# Patient Record
Sex: MALE | URBAN - METROPOLITAN AREA
[De-identification: names, ages, dates, MRNs, and addresses within clinical notes are randomized per-mention and may not be internally consistent; named-entity substitution may affect disease eponyms.]

---

## 2017-11-28 ENCOUNTER — OUTPATIENT (OUTPATIENT)
Dept: OUTPATIENT SERVICES | Facility: HOSPITAL | Age: 27
LOS: 1 days | Discharge: HOME | End: 2017-11-28

## 2017-11-28 DIAGNOSIS — J06.9 ACUTE UPPER RESPIRATORY INFECTION, UNSPECIFIED: ICD-10-CM

## 2018-09-19 ENCOUNTER — OUTPATIENT (OUTPATIENT)
Dept: OUTPATIENT SERVICES | Facility: HOSPITAL | Age: 28
LOS: 1 days | Discharge: HOME | End: 2018-09-19

## 2018-09-20 DIAGNOSIS — Z00.00 ENCOUNTER FOR GENERAL ADULT MEDICAL EXAMINATION WITHOUT ABNORMAL FINDINGS: ICD-10-CM

## 2019-06-12 ENCOUNTER — OUTPATIENT (OUTPATIENT)
Dept: OUTPATIENT SERVICES | Facility: HOSPITAL | Age: 29
LOS: 1 days | Discharge: HOME | End: 2019-06-12

## 2019-06-13 DIAGNOSIS — Z00.00 ENCOUNTER FOR GENERAL ADULT MEDICAL EXAMINATION WITHOUT ABNORMAL FINDINGS: ICD-10-CM

## 2024-02-12 PROBLEM — Z00.00 ENCOUNTER FOR PREVENTIVE HEALTH EXAMINATION: Status: ACTIVE | Noted: 2024-02-12

## 2024-02-16 ENCOUNTER — APPOINTMENT (OUTPATIENT)
Dept: ORTHOPEDIC SURGERY | Facility: CLINIC | Age: 34
End: 2024-02-16
Payer: MEDICAID

## 2024-02-16 VITALS
TEMPERATURE: 98 F | BODY MASS INDEX: 22.22 KG/M2 | DIASTOLIC BLOOD PRESSURE: 71 MMHG | SYSTOLIC BLOOD PRESSURE: 118 MMHG | OXYGEN SATURATION: 97 % | WEIGHT: 150 LBS | HEIGHT: 69 IN | HEART RATE: 77 BPM

## 2024-02-16 DIAGNOSIS — R22.31 LOCALIZED SWELLING, MASS AND LUMP, RIGHT UPPER LIMB: ICD-10-CM

## 2024-02-16 PROCEDURE — 99204 OFFICE O/P NEW MOD 45 MIN: CPT | Mod: 25

## 2024-02-16 PROCEDURE — 73060 X-RAY EXAM OF HUMERUS: CPT

## 2024-02-16 NOTE — DISCUSSION/SUMMARY
[Surgical risks reviewed] : Surgical risks reviewed [All Questions Answered] : Patient (and family) had all questions answered to an agreeable level of satisfaction [Interested in Proceeding] : Patient (and family) expressed understanding and interest in proceeding with the plan as outlined [de-identified] : Patient has a soft tissue mass that has been going on for 5 or 6 years.  He does not have any other masses that he.  This was biopsied in 2019 possibly for nerve sheath tumor but regardless I need the biopsy before proceeding.  For something slowly going on for a long time I think it is less likely to be sarcoma however it does need to be appropriately addressed.  Since there is a previous biopsy he will get me this old material so that I can review it and plan for possible surgery.  He understands being superficial this is likely to not be a huge issue with no significant nerve morbidity.  He may have some numbness.  He will get back to me all the information and we can plan for surgery in the future.  If imaging or pathology/biopsy was ordered, the patient was told to make an appointment to review findings right after all imaging is completed.  We discussed risks, benefits and alternatives. Rationale of care was reviewed and all questions were answered. Patient (and family) had all questions answered to her degree of the level of satisfaction. Patient (and family) expressed understanding and interest in proceeding with the plan as outlined.     This note was done with a voice recognition transcription software and any typos are related to this rather than medical error. Surgical risks reviewed. Patient (and family) had all questions answered to an agreeable level of satisfaction. Patient (and family) expressed understanding and interest in proceeding with the plan as outlined.

## 2024-02-16 NOTE — REVIEW OF SYSTEMS
[Feeling Tired] : not feeling tired [Joint Pain] : no joint pain [Joint Stiffness] : no joint stiffness [Anxiety] : no anxiety [Nl] : Hematologic/Lymphatic

## 2024-02-16 NOTE — HISTORY OF PRESENT ILLNESS
[FreeTextEntry1] : This is 33-year-old right-hand-dominant gentleman has been having a right upper medial arm mass for 5 to 6 years.  He had his first imaging in 2018 and in 2022 and 2023.  It has slowly grown over time.  Occasionally gives him some pain.  He is able to do all activity.  He was sent to me with a new MRI scan.  He said it was biopsied in 2019 at [facility] but he does not know what it was. [Stable] : stable [1] : currently ~his/her~ pain is 1 out of 10

## 2024-02-16 NOTE — DATA REVIEWED
[Imaging Present] : Present [de-identified] : X-rays today multiple views of the right humerus show a soft tissue shadow with concern anteromedially at the proximal arm below the axilla.  There is no calcifications or other problems seen.  MRI scan from December 2023 shows a 4 x 4 x 6 cm heterogeneous well-circumscribed lesion above the muscle pushing on the muscle.  This was thought to be possibly consistent with a nerve sheath tumor or fibroma.  They did not rule out sarcoma.  It is bigger than the previous MRI scan October 2018.

## 2024-02-16 NOTE — PHYSICAL EXAM
[FreeTextEntry1] : On exam the patient stands a good balance.  He is able to walk around appropriately with full range of motion.  His right upper extremity has a 4 to 5 cm mass anteromedially below the axilla.  There is no Tinel's thrills or bruits.  It is mobile medial lateral less so proximal distal.  He has full range of motion of his elbow wrist and hand with good strength and normal sensation throughout.  He has no neck clavicular or axillary lymphadenopathy felt. [General Appearance - Well-Appearing] : Well appearing [General Appearance - Well Nourished] : well nourished